# Patient Record
Sex: FEMALE
[De-identification: names, ages, dates, MRNs, and addresses within clinical notes are randomized per-mention and may not be internally consistent; named-entity substitution may affect disease eponyms.]

---

## 2022-01-27 PROBLEM — Z00.00 ENCOUNTER FOR PREVENTIVE HEALTH EXAMINATION: Status: ACTIVE | Noted: 2022-01-27

## 2022-03-29 ENCOUNTER — TRANSCRIPTION ENCOUNTER (OUTPATIENT)
Age: 57
End: 2022-03-29

## 2022-03-29 ENCOUNTER — APPOINTMENT (OUTPATIENT)
Dept: NEUROSURGERY | Facility: CLINIC | Age: 57
End: 2022-03-29

## 2022-03-29 ENCOUNTER — APPOINTMENT (OUTPATIENT)
Dept: NEUROSURGERY | Facility: CLINIC | Age: 57
End: 2022-03-29
Payer: COMMERCIAL

## 2022-03-29 DIAGNOSIS — H93.A9 PULSATILE TINNITUS, UNSPECIFIED EAR: ICD-10-CM

## 2022-03-29 DIAGNOSIS — G93.2 BENIGN INTRACRANIAL HYPERTENSION: ICD-10-CM

## 2022-03-29 PROCEDURE — EDU01: CPT

## 2022-04-04 NOTE — REASON FOR VISIT
[Home] : at home, [unfilled] , at the time of the visit. [Medical Office: ([facility])___] : at the medical office located in  [Verbal consent obtained from patient] : the patient, [unfilled] [New Patient Visit] : a new patient visit [FreeTextEntry1] : Pulsatile Tinnitus

## 2022-04-04 NOTE — HISTORY OF PRESENT ILLNESS
[de-identified] : I conducted a remote educational consult with ESTEFANY RAND on 03/29/2022. I explained that I am only able to provide an educational consult and not render treatment as I am not licensed in the state in which [place] is located. A written informed consent for the educational consult was obtained and is included in the EHR. ESTEFANY RAND is informed that there would be a $250 cost associated with the conduct of the remote educational consult.\par \par \par Ms. RAND is a pleasant 56 year old female who presents today with a chief complaint of RIGHT ear pulsatile tinnitus.  It first started in 2015 along with severe headaches.  Since that time it has been getting progressively worse.  It is described as a constant, whooshing sound that is in sync with her pulse.  It resolves with RIGHT neck pressure.\par \par Her headaches are described as constant, she usually does NOT wake up with a headache.\par \par She had a catheter angiogram/venogram with manometry in 11/2021 revealing significant pressure gradients.\par \par She had a lumbar puncture on 12/30/21 with an opening pressure of 20cm H2O.  After the LP, all of her symptoms resolved for 3 days. \par \par She saw an ophthalmologist 12/2021 and did not have papilledema at that time.  No vision complaints.

## 2022-04-04 NOTE — ASSESSMENT
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 This is a sequel of chronic severe stenosis at the junction of the transverse and sigmoid venous sinuses. We discussed the natural history of intracranial venous aneurysms and I explained to the patient that these aneurysms DO NOT cause intracranial hemorrhage or stroke. \par \par The pulsatile tinnitus is severe and has a negative impact in social life, work and daily living. We discussed treatment options which include observation, trial of Diamox, endovascular treatment, open surgery.\par \par The patient would like to proceed with endovascular treatment. Treatment includes stent -assisted embolization of the wide-neck venous aneurysm of the proximal sigmoid venous sinus. We discussed with the patient my extensive personal experience with this treatment and the results of a recent clinical trial (Kamla et al, Interventional Neuroradiology 2020). We discussed the procedure that has two components. The first part consists of catheter angiogram and manometry to assess the degree of stenosis and confirm the presence of the venous aneurysm. This part is typically performed with the patient awake. The second part consists of embolization of the venous aneurysm utilizing stent, coils or both and is performed under general anesthesia.\par \par The risks, benefits and alternatives of the procedure were discussed with the patient in detail. In my personal experience, the risks are very rare, but the possibility is not zero. Risks include stroke, brain hemorrhage, any type of disability (facial or extremity weakness, facial or extremity numbness, speech difficulties, blindness) and death. There are also possible complications related to the incisions such as infection, pain, swelling and bleeding.\par \par The patient is aware that the procedure requires dual antiplatelet therapy for 1-month post-stent (typically aspirin 325 mg daily and clopidogrel 75 mg daily) and antiplatelet monotherapy (typically aspirin 325 mg daily) for additional 11 months post-stent.\par \par PLAN\par Patient will call if she would like to proceed with Embolization of wide-neck venous aneurysm